# Patient Record
(demographics unavailable — no encounter records)

---

## 2024-10-23 NOTE — HISTORY OF PRESENT ILLNESS
[FreeTextEntry1] : Moved back from [facility] to live FT in [place] with his long term partner has been diagnosed with cognitive impairment and patient is the main carer. [de-identified] : 68 y/o male at high risk of cardiovascular disease due to pre-diabetes, HTN (2014), high lipids, BARRY and strong FH IHD. On Ozempic - has lost 20lbs, Lipitor recently increased by Cardiology. Reports of dizziness, especially when getting out of bed or standing up from a crouched position. This has been occurring more frequently in the past two months.  This coincides with starting SSRI which he is now tapering.

## 2024-10-23 NOTE — HISTORY OF PRESENT ILLNESS
[FreeTextEntry1] : Moved back from [facility] to live FT in [facility] with his long term partner has been diagnosed with cognitive impairment and patient is the main carer. [de-identified] : 68 y/o male at high risk of cardiovascular disease due to pre-diabetes, HTN (2014), high lipids, BARRY and strong FH IHD. On Ozempic - has lost 20lbs, Lipitor recently increased by Cardiology. Reports of dizziness, especially when getting out of bed or standing up from a crouched position. This has been occurring more frequently in the past two months.  This coincides with starting SSRI which he is now tapering.

## 2024-10-23 NOTE — HEALTH RISK ASSESSMENT
[Never] : Never [0-4] : 0-4 [Patient reported colonoscopy was abnormal] : Patient reported colonoscopy was abnormal [Hepatitis C test offered] : Hepatitis C test offered [HIV test declined] : HIV test declined [With Significant Other] : lives with significant other [Retired] : retired [Fully functional (bathing, dressing, toileting, transferring, walking, feeding)] : Fully functional (bathing, dressing, toileting, transferring, walking, feeding) [Fully functional (using the telephone, shopping, preparing meals, housekeeping, doing laundry, using] : Fully functional and needs no help or supervision to perform IADLs (using the telephone, shopping, preparing meals, housekeeping, doing laundry, using transportation, managing medications and managing finances) [Good] : ~his/her~ current health as good [0] : 2) Feeling down, depressed, or hopeless: Not at all (0) [PHQ-2 Negative - No further assessment needed] : PHQ-2 Negative - No further assessment needed [[id number]] : 0 [Change in mental status noted] : No change in mental status noted [ColonoscopyDate] : 04/19/2023 [ColonoscopyComments] : polyps [HIVDate] : 09/24 [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

## 2024-10-23 NOTE — PHYSICAL EXAM
[de-identified] :  Constitutional:  no acute distress, well nourished, well developed and well-appearing.  Eyes:  normal sclera/conjunctiva and pupils equal round and reactive to light.   ENT:  the outer ears and nose were normal in appearance.   Pulmonary:  no respiratory distress, no accessory muscle use, lungs were clear to auscultation bilaterally.   Cardiac:  normal rate, with a regular rhythm, normal S1 and S2 and no murmur heard.   Vascular:  there was no peripheral edema.   Musculoskeletal:  no joint swelling and grossly normal strength and tone.   Skin:  no rash.   Neurology:  coordination grossly intact, no focal deficits and normal gait.   Psychiatric:  memory grossly normal, the affect was normal, oriented to person, place, and time, the mood was normal and insight and judgment were intact.

## 2024-10-23 NOTE — HEALTH RISK ASSESSMENT
[Never] : Never [0-4] : 0-4 [Patient reported colonoscopy was abnormal] : Patient reported colonoscopy was abnormal [Hepatitis C test offered] : Hepatitis C test offered [HIV test declined] : HIV test declined [With Significant Other] : lives with significant other [Retired] : retired [Fully functional (bathing, dressing, toileting, transferring, walking, feeding)] : Fully functional (bathing, dressing, toileting, transferring, walking, feeding) [Fully functional (using the telephone, shopping, preparing meals, housekeeping, doing laundry, using] : Fully functional and needs no help or supervision to perform IADLs (using the telephone, shopping, preparing meals, housekeeping, doing laundry, using transportation, managing medications and managing finances) [Good] : ~his/her~ current health as good [0] : 2) Feeling down, depressed, or hopeless: Not at all (0) [PHQ-2 Negative - No further assessment needed] : PHQ-2 Negative - No further assessment needed [[id number]] : 0 [Change in mental status noted] : No change in mental status noted [ColonoscopyDate] : 04/19/2023 [ColonoscopyComments] : polyps [HIVDate] : 09/24 [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

## 2024-10-23 NOTE — REVIEW OF SYSTEMS
[Nocturia] : nocturia [Dysuria] : no dysuria [Hematuria] : no hematuria [Frequency] : no frequency [FreeTextEntry2] : Eyes:  no discharge and no vision problems.   HEENT:  no earache, no nasal discharge and no sore throat.   Cardiovascular:  no chest pain, no palpitations, no leg claudication, no lower extremity edema and no orthopnea.   Respiratory:  no shortness of breath and no cough.   Gastrointestinal:  no abdominal pain, no constipation, diarrhea, no vomiting, no heartburn and no melena.   Genitourinary:  no dysuria, no hematuria and no frequency.   Integumentary:  no skin rash.   Neurological: no seizures  no headache.   Constitutional review of systems are otherwise negative except as noted in HPI.  [FreeTextEntry5] : Dizziness - on standing

## 2024-10-23 NOTE — ASSESSMENT
[FreeTextEntry1] : He will follow up in 4 weeks to check about Ozempic, do labs as needed then because he had many labs done recently at last visit elsewhere.  He'll bring all including documentation of diabetes with 2 x HBA1c > 6.4%.  Cardiovascular issues - History of seeing a cardiologist Hypertension: On maintenance medication for hypertension. No changes in blood pressure medication recently. Continue current medication. Monitor blood pressure regularly.   Discussed goal of /80 or less, frequency of BP check, encouraged low salt f=diet, exercise and weight within BMI 20-25.  Adherence to current medications reinforced. Hypercholesterolemia: On maintenance medication for cholesterol. Recent lab results show cholesterol levels within normal range. Continue current medication. Regular monitoring of cholesterol levels - will review additional labs at next visit  Dizziness - Experiencing dizziness more frequently in the last two months, especially when getting out of bed or getting up from a crouched position - No recent changes in blood pressure medication - Taking sertraline since August, currently trying to wean off it Hydration, sit from lying, stand from sitting advice given.  Will d/c SSRI by Nov 1. Recent onset of frequent dizziness, especially when changing positions. Could be related to blood pressure medication and recent start of sertraline. Recommendation to stay hydrated and take time when changing positions to allow blood pressure to adjust. Consider reducing sertraline dosage gradually and monitor for changes in dizziness. Risks/Side effects: Dizziness may increase risk of falls or accidents.  Mental health - Started taking sertraline in August due to stress related to partner's health and relocation - Currently trying to wean off sertraline, plans to stop taking it by the end of the month - No feelings of being down, depressed or hopeless in the last two weeks - No loss of interest or pleasure in doing things that used to enjoy in the last two weeks  Constipation - Not usually constipated, but currently having trouble refilling Ozempic prescription  Arm pain and tingling - Recently started experiencing bad pain originating from shoulder and traveling down arm, with tingling in hands - Pain initially terminated at elbow but started moving down arm c/w C4/5 - Seen by physician at walk-in clinic who suggested it might be related to C4/C5 vertebrae - Pain has eased up but not completely gone - Pain started in mid-August, coinciding with moving out of [place] and a lot of packing, boxing, lifting - No X-ray done yet - will get XRay and will refer for PT  Social history - Relocated back to [place] permanently after being part-time resident between [place] and [place] since 2000 - 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 Also uses tadalafil as needed. Continue current medications. Offered DoxyPEP for post-exposure prophylaxis against chlamydia, syphilis, and gonorrhea. We discussed DoxyPEP as an option to reduce risk of GC/CT/syphilis when taken correctly (200 mgs ASAP and within 72 hours of possible exposure).  We also discussed the need to continue to get screened at appropriate intervals as DoxyPEP is % effective. - Has sex outside of relationship, on Apretude for PrEP and gets regular screenings at [facility] [place] We agree that he will continue to get PrEP at [place], consider switch to [facility] later - Taking tadalafil for sexual activity, currently not very sexually active  Prostate issues - On maintenance for BPH, taking tamsulosin for frequency of emptying the bladder - Frequency of urination has calmed down, but still goes to the bathroom about 3 times a night and during the day - No blood in urine or burning or stinging when urinating On tamsulosin for frequency of emptying the bladder. No signs of prostate cancer reported. Continue current medication. Monitor symptoms regularly.  Misc: - Had colonoscopy in 2023, had polyps which were removed - Last HIV test was in September of current year - Does not know when last hepatitis C test was - Had cataract surgery in both eyes in 2019 - Had elective surgery to remove large nevus on back in the '70s - Sleeps well, has mild sleep apnea and uses an oral appliance - All maintenance medications were introduced by [place] doctor - Refills medications through [facility] - Will bring labs from where getting the injectable prep at next visit in a month - Feels fine apart from dizziness and pinched nerve - Partner is having a bypass surgery at [facility] next week  Diabetes - new diagnosis based on HBA1cs that patient shows from prior notes. Recent lab results show elevated glucose levels indicating diabetes. Was on Ozempic but prescription ran out. Attempt to refill Ozempic prescription. In the meantime, resume metformin once daily until Ozempic is available again. Risks/Side effects discussed.  HBA1c to be logged in and labs done at next visit - Started Ozempic in May and ran out by end of July, started at .25 and then increased to .5 after 10 days - Blood work from early 2024 shows glucose levels out of normal range, indicating pre-diabetes or diabetes  Neck and arm pain Reports pain originating in neck and traveling down arm with tingling sensation in hands. Possible issue with C4/C5 vertebrae or rhomboid muscle. Referral for physical therapy and X-rays of the neck. Recommendation for exercises to strengthen core muscles. Diagnostic tests ordered - X-rays of the neck due to reported pain and tingling sensation - Physical therapy referral  Sleep apnea Mild sleep apnea managed with oral appliance. Continue use of oral appliance as recommended by previous healthcare provider.  Medication management All maintenance medications were introduced by previous [place] doctor. Can refill as needed. Send refills to the retail pharmacy or [facility] as per patient's preference. 10. Follow-up. Follow-up appointment in a month. Bring labs from where injectable prep is being received. Get COVID and flu shot at that time.  ICD-10 codes (8) - Essential (primary) hypertension [I10] - Hyperlipidemia, unspecified [E78.5] - Benign prostatic hyperplasia with lower urinary tract symptoms [N40.1] - Dizziness and giddiness [R42] - Prediabetes [R73.03] - Cervicalgia [M54.2] - Sleep apnea, unspecified [G47.30] - High risk heterosexual behavior [Z72.51]

## 2024-10-23 NOTE — PHYSICAL EXAM
[de-identified] :  Constitutional:  no acute distress, well nourished, well developed and well-appearing.  Eyes:  normal sclera/conjunctiva and pupils equal round and reactive to light.   ENT:  the outer ears and nose were normal in appearance.   Pulmonary:  no respiratory distress, no accessory muscle use, lungs were clear to auscultation bilaterally.   Cardiac:  normal rate, with a regular rhythm, normal S1 and S2 and no murmur heard.   Vascular:  there was no peripheral edema.   Musculoskeletal:  no joint swelling and grossly normal strength and tone.   Skin:  no rash.   Neurology:  coordination grossly intact, no focal deficits and normal gait.   Psychiatric:  memory grossly normal, the affect was normal, oriented to person, place, and time, the mood was normal and insight and judgment were intact.

## 2024-10-23 NOTE — ASSESSMENT
[FreeTextEntry1] : He will follow up in 4 weeks to check about Ozempic, do labs as needed then because he had many labs done recently at last visit elsewhere.  He'll bring all including documentation of diabetes with 2 x HBA1c > 6.4%.  Cardiovascular issues - History of seeing a cardiologist Hypertension: On maintenance medication for hypertension. No changes in blood pressure medication recently. Continue current medication. Monitor blood pressure regularly.   Discussed goal of /80 or less, frequency of BP check, encouraged low salt f=diet, exercise and weight within BMI 20-25.  Adherence to current medications reinforced. Hypercholesterolemia: On maintenance medication for cholesterol. Recent lab results show cholesterol levels within normal range. Continue current medication. Regular monitoring of cholesterol levels - will review additional labs at next visit  Dizziness - Experiencing dizziness more frequently in the last two months, especially when getting out of bed or getting up from a crouched position - No recent changes in blood pressure medication - Taking sertraline since August, currently trying to wean off it Hydration, sit from lying, stand from sitting advice given.  Will d/c SSRI by Nov 1. Recent onset of frequent dizziness, especially when changing positions. Could be related to blood pressure medication and recent start of sertraline. Recommendation to stay hydrated and take time when changing positions to allow blood pressure to adjust. Consider reducing sertraline dosage gradually and monitor for changes in dizziness. Risks/Side effects: Dizziness may increase risk of falls or accidents.  Mental health - Started taking sertraline in August due to stress related to partner's health and relocation - Currently trying to wean off sertraline, plans to stop taking it by the end of the month - No feelings of being down, depressed or hopeless in the last two weeks - No loss of interest or pleasure in doing things that used to enjoy in the last two weeks  Constipation - Not usually constipated, but currently having trouble refilling Ozempic prescription  Arm pain and tingling - Recently started experiencing bad pain originating from shoulder and traveling down arm, with tingling in hands - Pain initially terminated at elbow but started moving down arm c/w C4/5 - Seen by physician at walk-in clinic who suggested it might be related to C4/C5 vertebrae - Pain has eased up but not completely gone - Pain started in mid-August, coinciding with moving out of [place] and a lot of packing, boxing, lifting - No X-ray done yet - will get XRay and will refer for PT  Social history - Relocated back to [place] permanently after being part-time resident between [place] and [place] since 2000 - 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 Also uses tadalafil as needed. Continue current medications. Offered DoxyPEP for post-exposure prophylaxis against chlamydia, syphilis, and gonorrhea. We discussed DoxyPEP as an option to reduce risk of GC/CT/syphilis when taken correctly (200 mgs ASAP and within 72 hours of possible exposure).  We also discussed the need to continue to get screened at appropriate intervals as DoxyPEP is % effective. - Has sex outside of relationship, on Apretude for PrEP and gets regular screenings at [facility] [place] We agree that he will continue to get PrEP at [place], consider switch to [facility] later - Taking tadalafil for sexual activity, currently not very sexually active  Prostate issues - On maintenance for BPH, taking tamsulosin for frequency of emptying the bladder - Frequency of urination has calmed down, but still goes to the bathroom about 3 times a night and during the day - No blood in urine or burning or stinging when urinating On tamsulosin for frequency of emptying the bladder. No signs of prostate cancer reported. Continue current medication. Monitor symptoms regularly.  Misc: - Had colonoscopy in 2023, had polyps which were removed - Last HIV test was in September of current year - Does not know when last hepatitis C test was - Had cataract surgery in both eyes in 2019 - Had elective surgery to remove large nevus on back in the '70s - Sleeps well, has mild sleep apnea and uses an oral appliance - All maintenance medications were introduced by [place] doctor - Refills medications through [facility] - Will bring labs from where getting the injectable prep at next visit in a month - Feels fine apart from dizziness and pinched nerve - Partner is having a bypass surgery at [facility] next week  Diabetes - new diagnosis based on HBA1cs that patient shows from prior notes. Recent lab results show elevated glucose levels indicating diabetes. Was on Ozempic but prescription ran out. Attempt to refill Ozempic prescription. In the meantime, resume metformin once daily until Ozempic is available again. Risks/Side effects discussed.  HBA1c to be logged in and labs done at next visit - Started Ozempic in May and ran out by end of July, started at .25 and then increased to .5 after 10 days - Blood work from early 2024 shows glucose levels out of normal range, indicating pre-diabetes or diabetes  Neck and arm pain Reports pain originating in neck and traveling down arm with tingling sensation in hands. Possible issue with C4/C5 vertebrae or rhomboid muscle. Referral for physical therapy and X-rays of the neck. Recommendation for exercises to strengthen core muscles. Diagnostic tests ordered - X-rays of the neck due to reported pain and tingling sensation - Physical therapy referral  Sleep apnea Mild sleep apnea managed with oral appliance. Continue use of oral appliance as recommended by previous healthcare provider.  Medication management All maintenance medications were introduced by previous [place] doctor. Can refill as needed. Send refills to the retail pharmacy or [facility] as per patient's preference. 10. Follow-up. Follow-up appointment in a month. Bring labs from where injectable prep is being received. Get COVID and flu shot at that time.  ICD-10 codes (8) - Essential (primary) hypertension [I10] - Hyperlipidemia, unspecified [E78.5] - Benign prostatic hyperplasia with lower urinary tract symptoms [N40.1] - Dizziness and giddiness [R42] - Prediabetes [R73.03] - Cervicalgia [M54.2] - Sleep apnea, unspecified [G47.30] - High risk heterosexual behavior [Z72.51]

## 2024-11-20 NOTE — REVIEW OF SYSTEMS
[FreeTextEntry2] : Eyes:  no discharge and no vision problems.   HEENT:  no earache, no nasal discharge and no sore throat.   Cardiovascular:  no chest pain, no palpitations, no leg claudication, no lower extremity edema and no orthopnea.   Respiratory:  no shortness of breath and no cough.   Gastrointestinal:  no abdominal pain, no constipation, diarrhea, no vomiting, no heartburn and no melena.   Genitourinary:  no dysuria, no hematuria and no frequency.   Integumentary:  no skin rash.   Neurological: no seizures  no headache.   Constitutional review of systems are otherwise negative except as noted in HPI.  [FreeTextEntry5] : Dizziness

## 2024-11-20 NOTE — ASSESSMENT
[FreeTextEntry1] : Assessment & Plan Presyncope - Patient reports experiencing presyncope, which he attributes to age. He denies experiencing dizziness. Blood pressure checked while lying and standing, no significant drop noted indicating no postural hypotension. - Monitor symptoms. If symptoms worsen or patient experiences a fall, further evaluation will be needed. - Encourage increased fluid intake - Consider if Apretude could be at least partially causing this although he has been on this for much longer than he has had these symptoms  PrEP -this is being managed elsewhere and he is up to date with screenings  Paresthesia in the upper extremity - Patient reports experiencing paresthesia in his upper extremity, which has dissipated but is still present. He has started physiotherapy for this issue. He also reports that he has been on meloxicam, which he stopped taking last week. Continue physiotherapy. Monitor symptoms.  Lab tests - Plan to conduct laboratory tests to evaluate thyroid function, blood glucose levels, and complete blood count. - Await lab results.  Colonoscopy - Last colonoscopy was in 2023, during which polyps were identified and excised. - Plan for another colonoscopy in 2026.  Follow-up - Schedule a follow-up visit in three months. - Patient is on the portal and can send messages for any concerns. - Discuss the lightheadedness and potential causes, including medication side effects, at the next visit.

## 2024-11-20 NOTE — HISTORY OF PRESENT ILLNESS
[FreeTextEntry1] : Here to follow up on a couple of issues from summer Partner bypass surgery at [place] 11/1/24 and stent planned Subjective Patient is a 69-year-old male on Ozempic for diabetes management. He started on a small dose at [place], but due to a physician's maternity leave, he ended up back on 0.25 mg. He has previously been on 0.5 mg. He has taken three doses of 0.25 mg. When off the medication, he experienced food cravings and gained 6 pounds. He reports lightheadedness, not dizziness, and attributes it to age. His mental health is stable with no self-harm thoughts, despite stress and work. He reports tingling in his arm, which has improved but persists. He started physiotherapy and stopped meloxicam last week.  [de-identified] : 1) Cardiovascular: Hyperlipidemia and Hypertension:  2) Dizziness: thought to be due to SSRI which he was stopping - still feels lightheaded at times and is off SSRI. 3) Mental Health: stressed but feels as good as has been for many years 4) Arm pain and tingling improving 5) PrEP: on Apretude for PrEP and gets regular screenings at [facility] [place] We agree that he will continue to get PrEP at [place], consider switch to [facility] later 6) ED: Taking tadalafil for sexual activity, currently not very sexually active 7) BPH: tamsulosin for frequency of emptying the bladder 8) HCM: colonoscopy in 2023, had polyps which were removed 9) DM: restarted Ozempic at last visit and has taken for 3 doses

## 2024-11-20 NOTE — HISTORY OF PRESENT ILLNESS
[FreeTextEntry1] : Here to follow up on a couple of issues from summer Partner bypass surgery at [place] 11/1/24 and stent planned Subjective Patient is a 69-year-old male on Ozempic for diabetes management. He started on a small dose at [place], but due to a physician's maternity leave, he ended up back on 0.25 mg. He has previously been on 0.5 mg. He has taken three doses of 0.25 mg. When off the medication, he experienced food cravings and gained 6 pounds. He reports lightheadedness, not dizziness, and attributes it to age. His mental health is stable with no self-harm thoughts, despite stress and work. He reports tingling in his arm, which has improved but persists. He started physiotherapy and stopped meloxicam last week.  [de-identified] : 1) Cardiovascular: Hyperlipidemia and Hypertension:  2) Dizziness: thought to be due to SSRI which he was stopping - still feels lightheaded at times and is off SSRI. 3) Mental Health: stressed but feels as good as has been for many years 4) Arm pain and tingling improving 5) PrEP: on Apretude for PrEP and gets regular screenings at [facility] [place] We agree that he will continue to get PrEP at [place], consider switch to [facility] later 6) ED: Taking tadalafil for sexual activity, currently not very sexually active 7) BPH: tamsulosin for frequency of emptying the bladder 8) HCM: colonoscopy in 2023, had polyps which were removed 9) DM: restarted Ozempic at last visit and has taken for 3 doses

## 2025-01-13 NOTE — HISTORY OF PRESENT ILLNESS
[FreeTextEntry1] : 68 y/o male with h/o cad, htn, hl, predm, family h/o early cvd, nilesh who presents for f/up today  last seen   -LpA elevated  -Calcium score 10/24: Cardiac: 1.  The calcium score is moderate at 119 Agatston units, which is at the 48 percentile, adjusted for age, gender and race. 2.  Aortic and aortic valve calcification. Non-cardiac: No acute findings  -Echo 10/24: 1. Left ventricular cavity is normal in size. Left ventricular wall thickness is normal. Left ventricular systolic function is normal with an ejection fraction of 63 % by Rivas's method of disks. There are no regional wall motion abnormalities seen. 2. Normal left ventricular diastolic function. 3. Normal right ventricular cavity size, with normal wall thickness, and normal right ventricular systolic function. 4. No pericardial effusion seen. 5. Chordal systolic anterior motion is present without a significant gradient at rest. 6. Mild left ventricular hypertrophy. 7. There is moderate thickening of the aortic valve leaflets. 8. No significant valvular disease.  no cp, sob, syncope, palpitations, orthopnea, pnd, edema, fatigue notes some LH with change in position  mild nilesh diagnosed - uses oral appliance   htn diagnosed 10 years ago started statin  - lipitor increased to 40 - 6 weeks ago ozempic started a few months ago - has lost 20 lbs  on sertraline - not seeing therapist  walks for exercise diet could be improved stress moderate sleep 8 hours   PMH/PSH: htn hl predm bph cataract surgery nilesh cad   ALL: nkda  MEDS: lipitor 40 mg qhs norvasc 5 mg qd tadalafil 20 mg sertraline 50 mg qd asa 81 mg qd tamsulosin .8 mg qd vit d/c zinc tumeric apretude roopa 600 mg ER every 2 months ozempic   SH: no tobacco/etoh/drugs from [place] lived [place] 50 years retired former worked [employer] no children    FH: mother -  MI 81 father -  74, CVA 65 brother -  61 MI sister - alive, htn, hl, 78

## 2025-01-13 NOTE — DISCUSSION/SUMMARY
[Patient] : the patient [___ Month(s)] : in [unfilled] month(s) [FreeTextEntry1] : 68 y/o male with h/o cad, htn, hl, predm, cad, family h/o early cvd, nilesh who presents for initial evaluation today   -Calcium score 10/24: Cardiac: 1.  The calcium score is moderate at 119 Agatston units, which is at the 48 percentile, adjusted for age, gender and race. 2.  Aortic and aortic valve calcification. Non-cardiac: No acute findings -Echo 10/24: 1. Left ventricular cavity is normal in size. Left ventricular wall thickness is normal. Left ventricular systolic function is normal with an ejection fraction of 63 % by Rivas's method of disks. There are no regional wall motion abnormalities seen. 2. Normal left ventricular diastolic function. 3. Normal right ventricular cavity size, with normal wall thickness, and normal right ventricular systolic function. 4. No pericardial effusion seen. 5. Chordal systolic anterior motion is present without a significant gradient at rest. 6. Mild left ventricular hypertrophy. 7. There is moderate thickening of the aortic valve leaflets. 8. No significant valvular disease. -ekg 9/24 - nsr, normal intervals, no st/t changes -labs 2024 reviewed, lipids ordered  -close monitoring blood sugar for predm -continue asa, statin -continue norvasc - lower to 2.5 mg qd -nilesh - using oral appliance -on ozempic -counseled on cvd risk factors -f/up 1 months for cvd risk factors, bp  I have spent 30 minutes reviewing labs, records, tests and discussed cvd risk factors, htn, hl.

## 2025-01-30 NOTE — HISTORY OF PRESENT ILLNESS
[FreeTextEntry1] : Here for follow up of multiple medical issues [de-identified] : Interim history and medication adjustments: 1) Lipitor was increased to 40 mg and aspirin started based on intermediate Calcium score from CT scan 2) Continues with some post hypotension but this has improved since decreasing BP medications  Subjective 11/1/24 his partner (Olegario) had bypass and stents and is doing well.  The patient reports that their partner, Olegario, has undergone several medical procedures, including a bypass and stent placements, due to blockages. The partner is currently doing well, although there was significant bruising in the groin area. The patient's weeks are filled with medical visits for both themselves and their partner, which is limiting.  PrEP -  The patient is on Apretude for PrEP, with the most recent dose administered last week at [place]. The patient mentions being celibate within their relationship and having some sexual relationships outside of this relationship.  They use Doxycycline as post-exposure prophylaxis, with a recent encounter occurring the previous night. The patient plans to take the medication today.   Weight loss -  They are also taking Ozempic at a 0.5 dosage for prediabetes. The patient experiences dizziness when getting up from bed or a seated position, which persists despite a recent reduction in amlodipine dosage from 5 mg to 2.5 mg. The patient acknowledges not drinking enough fluids during the day, with most fluid intake occurring between 7 PM and 1 AM, primarily in the form of homemade iced tea.  Exercise was previously regular, with treadmill use four times a week until July, when their partner's health issues arose. The partner has reduced cannabis use, which the patient believes may have been related to previous issues with memory loss and confusion. The patient expresses a desire to return to their exercise routine as their partner's health stabilizes.  They received flu and COVID vaccinations in October and have started a Hepatitis B vaccination series, with the next dose scheduled for March.

## 2025-01-30 NOTE — ASSESSMENT
[FreeTextEntry1] : Interim history: - Recent STD and HIV tests were negative - Last colonoscopy in 2021 with polyps found - Amlodipine dose reduced from 5 mg to 2.5 mg two weeks ago - Lipid panel done by cardiologist a couple of weeks ago - Recent blood work for PrEP about a week or 10 days ago - Hepatitis B vaccine received, one of three doses completed  Assessment & Plan Cardiovascular Health: Patient has a family history of cardiovascular issues and has been advised by a cardiologist to increase Lipitor dosage from 20 mg to 40 mg due to the presence of plaque. Low-dose aspirin has also been recommended. Dizziness upon standing may be related to blood pressure medication and this has improved since Amlodipine was decreased. Monitor dizziness and adjust fluid intake throughout the day. No need for additional blood pressure control as current levels are satisfactory.  Dizziness likely related to blood pressure medication. Amlodipine dosage has been reduced from 5 mg to 2.5 mg to address dizziness.  Monitor dizziness and ensure adequate fluid intake throughout the day. No further changes to medication at this time.  Continue with increased Lipitor dosage and low-dose aspirin.  Pre-Diabetes: Patient has a diagnosis of pre-diabetes and is on Ozempic 0.5 mg dosage for weight loss. Encourage exercise, specifically returning to treadmill use, to help manage pre-diabetes. Schedule A1C test in three months to monitor blood sugar levels.  Sexual Health: Patient is on Apretude for PrEP and has been prescribed doxycycline for post-exposure prophylaxis after sexual encounters.  Continue with Apretude for PrEP. Take doxycycline within 72 hours after sexual encounters. Conduct regular STD screenings, including oral and urine tests, and blood tests for A1C, HIV, syphilis, and hepatitis C.  Vaccinations: Patient has received one dose of the Hepatitis B vaccine. Continue with the Hepatitis B vaccination series, with the next dose scheduled in March.  ICD-10 codes (5) - Dizziness and giddiness [R42] - Prediabetes [R73.03] - Essential (primary) hypertension [I10] - Hyperlipidemia, unspecified [E78.5] - Encounter for immunization [Z23]

## 2025-01-30 NOTE — HISTORY OF PRESENT ILLNESS
[FreeTextEntry1] : Here for follow up of multiple medical issues [de-identified] : Interim history and medication adjustments: 1) Lipitor was increased to 40 mg and aspirin started based on intermediate Calcium score from CT scan 2) Continues with some post hypotension but this has improved since decreasing BP medications  Subjective 11/1/24 his partner (Olegario) had bypass and stents and is doing well.  The patient reports that their partner, Olegario, has undergone several medical procedures, including a bypass and stent placements, due to blockages. The partner is currently doing well, although there was significant bruising in the groin area. The patient's weeks are filled with medical visits for both themselves and their partner, which is limiting.  PrEP -  The patient is on Apretude for PrEP, with the most recent dose administered last week at [place]. The patient mentions being celibate within their relationship and having some sexual relationships outside of this relationship.  They use Doxycycline as post-exposure prophylaxis, with a recent encounter occurring the previous night. The patient plans to take the medication today.   Weight loss -  They are also taking Ozempic at a 0.5 dosage for prediabetes. The patient experiences dizziness when getting up from bed or a seated position, which persists despite a recent reduction in amlodipine dosage from 5 mg to 2.5 mg. The patient acknowledges not drinking enough fluids during the day, with most fluid intake occurring between 7 PM and 1 AM, primarily in the form of homemade iced tea.  Exercise was previously regular, with treadmill use four times a week until July, when their partner's health issues arose. The partner has reduced cannabis use, which the patient believes may have been related to previous issues with memory loss and confusion. The patient expresses a desire to return to their exercise routine as their partner's health stabilizes.  They received flu and COVID vaccinations in October and have started a Hepatitis B vaccination series, with the next dose scheduled for March.

## 2025-03-04 NOTE — HISTORY OF PRESENT ILLNESS
[FreeTextEntry1] : 69 y/o male with h/o cad, htn, hl, predm, family h/o early cvd, nilesh who presents for f/up today  last seen   norvasc lowered to 2.5 mg  notes some improvement in LH but still notes some positional LH no cp, sob, syncope, palpitations, orthopnea, pnd, edema, fatigue  -LpA elevated  -Calcium score 10/24: Cardiac: 1. The calcium score is moderate at 119 Agatston units, which is at the 48 percentile, adjusted for age, gender and race. 2. Aortic and aortic valve calcification. Non-cardiac: No acute findings  -Echo 10/24: 1. Left ventricular cavity is normal in size. Left ventricular wall thickness is normal. Left ventricular systolic function is normal with an ejection fraction of 63 % by Rivas's method of disks. There are no regional wall motion abnormalities seen. 2. Normal left ventricular diastolic function. 3. Normal right ventricular cavity size, with normal wall thickness, and normal right ventricular systolic function. 4. No pericardial effusion seen. 5. Chordal systolic anterior motion is present without a significant gradient at rest. 6. Mild left ventricular hypertrophy. 7. There is moderate thickening of the aortic valve leaflets. 8. No significant valvular disease.    mild nilesh diagnosed - uses oral appliance   htn diagnosed 10 years ago started statin  - lipitor increased to 40 - 6 weeks ago ozempic started a few months ago - has lost 20 lbs  on sertraline - not seeing therapist  walks for exercise diet could be improved stress moderate sleep 8 hours   PMH/PSH: htn hl predm bph cataract surgery nilesh cad   ALL: nkda  MEDS: lipitor 40 mg qhs norvasc 2.5 mg qd tadalafil 20 mg sertraline 50 mg qd asa 81 mg qd tamsulosin .8 mg qd vit d/c zinc tumeric apretude roopa 600 mg ER every 2 months ozempic   SH: no tobacco/etoh/drugs from [place] lived [place] 50 years retired former worked [employer] no children    FH: mother -  MI 81 father -  74, CVA 65 brother -  61 MI sister - alive, htn, hl, 78

## 2025-03-04 NOTE — DISCUSSION/SUMMARY
[Patient] : the patient [___ Month(s)] : in [unfilled] month(s) [EKG obtained to assist in diagnosis and management of assessed problem(s)] : EKG obtained to assist in diagnosis and management of assessed problem(s) [FreeTextEntry1] : 71 y/o male with h/o cad, htn, hl, predm, cad, family h/o early cvd, nilesh who presents for f/up today  -Calcium score 10/24: Cardiac: 1. The calcium score is moderate at 119 Agatston units, which is at the 48 percentile, adjusted for age, gender and race. 2. Aortic and aortic valve calcification. Non-cardiac: No acute findings -Echo 10/24: 1. Left ventricular cavity is normal in size. Left ventricular wall thickness is normal. Left ventricular systolic function is normal with an ejection fraction of 63 % by Rivas's method of disks. There are no regional wall motion abnormalities seen. 2. Normal left ventricular diastolic function. 3. Normal right ventricular cavity size, with normal wall thickness, and normal right ventricular systolic function. 4. No pericardial effusion seen. 5. Chordal systolic anterior motion is present without a significant gradient at rest. 6. Mild left ventricular hypertrophy. 7. There is moderate thickening of the aortic valve leaflets. 8. No significant valvular disease. -ekg ordered today - nsr, normal intervals, no st/t changes -labs 2024 reviewed  -close monitoring blood sugar for predm -continue asa, statin - increase to lipitor 80 -dc norvasc and close monitoring bp -nilesh - using oral appliance -on ozempic -counseled on cvd risk factors -f/up 3 months for bp, lipids  I have spent 30 minutes reviewing labs, records, tests and discussed cvd risk factors, htn, hl.

## 2025-04-03 NOTE — HEALTH RISK ASSESSMENT
[0] : 2) Feeling down, depressed, or hopeless: Not at all (0) [PHQ-2 Negative - No further assessment needed] : PHQ-2 Negative - No further assessment needed [Never] : Never [[id number]] : 0

## 2025-04-03 NOTE — ASSESSMENT
[FreeTextEntry1] : 1) Healthcare Maintenance Test results: - Colonoscopy in 2021: Polyps removed, no major concerns - Prostate cancer screening blood test planned (not performed during this visit) 2) HTN Blood Pressure Management: Blood pressure is stable after discontinuation of amlodipine due to lightheadedness. No current medication for blood pressure. Monitor for dizziness when using Cialis and consider reducing the dose if dizziness occurs. Ensure adequate hydration when taking Cialis. 3) Weight management Patient is on Ozempic, recently increased to 1.0 mg, prescribed by [facility]. Continue current regimen. No changes recommended. 4) Measles Immunity: History of measles infection at age 14. Consider blood test to check immunity levels at next blood draw. 5) Colonoscopy: Last colonoscopy in 2021 with polyp removal. Schedule a referral for a follow-up colonoscopy. Gather previous test results for the appointment. 6) Prostate Cancer Screening: No family history of prostate cancer. Screening is considered. Perform a PSA blood test. If elevated, follow up with an MRI. 7) Tamsulosin Use: Uncertain effectiveness of tamsulosin. Discontinue tamsulosin to assess any changes in symptoms.

## 2025-04-03 NOTE — HISTORY OF PRESENT ILLNESS
[FreeTextEntry1] : Partner bypass surgery at [place] 11/1/24 and stent  DM on Ozempic PrEP: Apretude at [facility] HTN and lipids ED  [de-identified] : Abdiaziz is here for routine follow up.  Discussed the health of the , who is 76 years old and has undergone a bypass, received three stents. He mentions that life has changed due to the 's health, leading to less travel and a preference to stay near medical facilities. Previously experienced lightheadedness while on amlodipine, which was discontinued, resulting in improvement. Currently uses Cialis as needed, being aware of potential dizziness if blood pressure is low. Continues to take injectable PrEP at [facility], with the last injection approximately 10 days ago. He is getting all PrEP screenings there.  Recently increased the Ozempic dose to 1.0 mg two weeks ago, prescribed by [facility], but has not seen significant weight change. Had measles around age 12 or 14. Underwent a colonoscopy in 2021, during which polyps were removed without significant concern. Observes Ramadan and is currently fasting to make up for missed days. Takes tamsulosin but is unsure of its effectiveness.

## 2025-04-03 NOTE — HEALTH RISK ASSESSMENT
[0] : 2) Feeling down, depressed, or hopeless: Not at all (0) [PHQ-2 Negative - No further assessment needed] : PHQ-2 Negative - No further assessment needed [Never] : Never [[id number]] : 0

## 2025-04-03 NOTE — HEALTH RISK ASSESSMENT
[0] : 2) Feeling down, depressed, or hopeless: Not at all (0) [PHQ-2 Negative - No further assessment needed] : PHQ-2 Negative - No further assessment needed [Never] : Never [[id number]] : 0

## 2025-04-03 NOTE — HISTORY OF PRESENT ILLNESS
[FreeTextEntry1] : Partner bypass surgery at [place] 11/1/24 and stent  DM on Ozempic PrEP: Apretude at [facility] HTN and lipids ED  [de-identified] : Abdiaziz is here for routine follow up.  Discussed the health of the , who is 76 years old and has undergone a bypass, received three stents. He mentions that life has changed due to the 's health, leading to less travel and a preference to stay near medical facilities. Previously experienced lightheadedness while on amlodipine, which was discontinued, resulting in improvement. Currently uses Cialis as needed, being aware of potential dizziness if blood pressure is low. Continues to take injectable PrEP at [facility], with the last injection approximately 10 days ago. He is getting all PrEP screenings there.  Recently increased the Ozempic dose to 1.0 mg two weeks ago, prescribed by [facility], but has not seen significant weight change. Had measles around age 12 or 14. Underwent a colonoscopy in 2021, during which polyps were removed without significant concern. Observes Ramadan and is currently fasting to make up for missed days. Takes tamsulosin but is unsure of its effectiveness.

## 2025-04-03 NOTE — HISTORY OF PRESENT ILLNESS
[FreeTextEntry1] : Partner bypass surgery at [place] 11/1/24 and stent  DM on Ozempic PrEP: Apretude at [facility] HTN and lipids ED  [de-identified] : Abdiaziz is here for routine follow up.  Discussed the health of the , who is 76 years old and has undergone a bypass, received three stents. He mentions that life has changed due to the 's health, leading to less travel and a preference to stay near medical facilities. Previously experienced lightheadedness while on amlodipine, which was discontinued, resulting in improvement. Currently uses Cialis as needed, being aware of potential dizziness if blood pressure is low. Continues to take injectable PrEP at [facility], with the last injection approximately 10 days ago. He is getting all PrEP screenings there.  Recently increased the Ozempic dose to 1.0 mg two weeks ago, prescribed by [facility], but has not seen significant weight change. Had measles around age 12 or 14. Underwent a colonoscopy in 2021, during which polyps were removed without significant concern. Observes Ramadan and is currently fasting to make up for missed days. Takes tamsulosin but is unsure of its effectiveness.

## 2025-05-13 NOTE — DISCUSSION/SUMMARY
[Patient] : the patient [___ Month(s)] : in [unfilled] month(s) [FreeTextEntry1] : 69 y/o male with h/o cad, htn, hl, predm, cad, family h/o early cvd, nilesh who presents for f/up today  -advised f/up with pcp for headaches -CTA cor 4/24: calcium score 158, mild nonobstructive plaque prox LAD, mild mid LCx, mild prox OM, mild prox/mid RCA, mild calcifications AV and annulus -Calcium score 10/24: Cardiac: 1. The calcium score is moderate at 119 Agatston units, which is at the 48 percentile, adjusted for age, gender and race. 2. Aortic and aortic valve calcification. Non-cardiac: No acute findings -Echo 10/24: 1. Left ventricular cavity is normal in size. Left ventricular wall thickness is normal. Left ventricular systolic function is normal with an ejection fraction of 63 % by Rivas's method of disks. There are no regional wall motion abnormalities seen. 2. Normal left ventricular diastolic function. 3. Normal right ventricular cavity size, with normal wall thickness, and normal right ventricular systolic function. 4. No pericardial effusion seen. 5. Chordal systolic anterior motion is present without a significant gradient at rest. 6. Mild left ventricular hypertrophy. 7. There is moderate thickening of the aortic valve leaflets. 8. No significant valvular disease. -ekg 3/25 - nsr, normal intervals, no st/t changes -labs 2024 reviewed, lipids, A1c ordered -LpA elevated -close monitoring blood sugar for predm -continue asa, statin   -nilesh - using oral appliance -on ozempic -counseled on cvd risk factors -f/up 6 months for cvd risk factors  I have spent 30 minutes reviewing labs, records, tests and discussed cvd risk factors, htn, hl.

## 2025-05-13 NOTE — HISTORY OF PRESENT ILLNESS
[FreeTextEntry1] : 71 y/o male with h/o cad, htn, hl, predm, family h/o early cvd, nilesh who presents for f/up today  last seen 3/25  notes new left sided headaches at night for past few weeks norvsac dc'd lipitor increased to 80  -CTA cor : calcium score 158, mild nonobstructive plaque prox LAD, mild mid LCx, mild prox OM, mild prox/mid RCA, mild calcifications AV and annulus  no cp, sob, syncope, palpitations, orthopnea, pnd, edema, fatigue  -LpA elevated  -Calcium score 10/24: Cardiac: 1. The calcium score is moderate at 119 Agatston units, which is at the 48 percentile, adjusted for age, gender and race. 2. Aortic and aortic valve calcification. Non-cardiac: No acute findings  -Echo 10/24: 1. Left ventricular cavity is normal in size. Left ventricular wall thickness is normal. Left ventricular systolic function is normal with an ejection fraction of 63 % by Rivas's method of disks. There are no regional wall motion abnormalities seen. 2. Normal left ventricular diastolic function. 3. Normal right ventricular cavity size, with normal wall thickness, and normal right ventricular systolic function. 4. No pericardial effusion seen. 5. Chordal systolic anterior motion is present without a significant gradient at rest. 6. Mild left ventricular hypertrophy. 7. There is moderate thickening of the aortic valve leaflets. 8. No significant valvular disease.   mild nilesh diagnosed - uses oral appliance   htn diagnosed 10 years ago started statin  - lipitor increased to 40 - 6 weeks ago ozempic started a few months ago - has lost 20 lbs  on sertraline - not seeing therapist  walks for exercise diet could be improved stress moderate sleep 8 hours   PMH/PSH: htn hl predm bph cataract surgery nilesh cad   ALL: nkda  MEDS: lipitor 80 mg qhs tadalafil 20 mg sertraline 50 mg qd asa 81 mg qd tamsulosin .8 mg qd vit d/c zinc tumeric apretude roopa 600 mg ER every 2 months ozempic   SH: no tobacco/etoh/drugs from [place] lived [place] years retired former worked [employer] no children    FH: mother -  MI 81 father -  74, CVA 65 brother -  61 MI sister - alive, htn, hl, 78

## 2025-06-20 NOTE — ASSESSMENT
[FreeTextEntry1] : 69 YO M with PMH of CAD, HLD, BPH, prediabetes, BARRY presents for colon cancer screening. Patient feels well without GI complaints.   #History of Colon Polyps - last cscope 2023: 7 polyps, diverticulosis, non bleeding internal hemorrhoids. Repeat in 3 years (2026) - will request pathology report - 9/24 cmp 11/24 cbc unremarkable - Schedule patient for colonoscopy at [facility] with Sutab prep in April 2026. - will need to hold Ozempic 1 week prior to procedure  - Discussed R/A/I/B with patient. Education provided on preparation instructions and the need for an escort.  Follow up post procedure

## 2025-06-20 NOTE — HISTORY OF PRESENT ILLNESS
[FreeTextEntry1] : 69 YO M with PMH of HLD, BPH, prediabetes presents for colon cancer screening.    During recent travel had 8 days of diarrhea that has now resolved. Typically has 1 BM daily. Patient otherwise feels well without GI complaints. GI ROS negative for unintentional weight loss, fevers/chills, dysphagia, reflux, abdominal pain, bloating, nausea, vomiting, early satiety, constipation, melena, hematochezia.   Moved back to [place] from [place] last year    Referred by: partner is a patient of Dr. Segura   PMH: HLD, BPH, prediabetes  PSH: cataract  Family history: denies GI malignancy  Allergies: NKDA Medications: atorvastatin, asa 81, tamsulosin, ozempic, apretude  Supplements/OTC: turmeric, zinc, vit c, vit d AC or NSAIDs: asa   Colon Cancer Screenin 7 polyps, diverticulosis, non bleeding internal hemorrhoids. repeat in 3 years  EGD: denies Labs:  cmp  cbc unremarkable  Imaging: denies    Tobacco: denies  Alcohol: denies  Drugs: denies

## 2025-06-25 NOTE — HISTORY OF PRESENT ILLNESS
[FreeTextEntry1] : Headaches - over last few months, Abdiaziz reports he has had increasing number of headaches, These tend to occur in the PM and resolve after a night's sleep.  They have decreased since he had a dental problem fixed 4 weeks ago, although he did have another two days ago.  We explored possible connection to caffeine, sleep apnea and dental issue.   [de-identified] : Co-management: Ozempic for pre-DM and Apretude for PrEP prescribed at [facility] where he has gotten his 3 monthly labs on time.  He will consider moving his care to one PCP but is concerned about moving his Ozempic and Apretude because it was a long uphill struggle to get these approved at [facility].

## 2025-06-25 NOTE — PLAN
[FreeTextEntry1] : 1) Headaches Likely related to Dental, resolved Will monitor and use mouth guard for sleep agnea Will observe if caffeine days > headaches 2) PrEP - continue care at [facility] 3) Pre-DM - continue care at [facility] 4) Urinary frequency - will try to decrease tamsulosin as feels doesn't need it

## 2025-06-25 NOTE — PLAN
[FreeTextEntry1] : 1) Headaches Likely related to Dental, resolved Will monitor and use mouth guard for sleep agnea Will observe if caffeine days > headaches 2) PrEP - continue care at [facility] 3) Pre-DM - continue care at [facility] 4) Urinary frequency - will try to decrease tamsulosin as feels doesn't need it

## 2025-06-25 NOTE — HISTORY OF PRESENT ILLNESS
[FreeTextEntry1] : Headaches - over last few months, Abdiaziz reports he has had increasing number of headaches, These tend to occur in the PM and resolve after a night's sleep.  They have decreased since he had a dental problem fixed 4 weeks ago, although he did have another two days ago.  We explored possible connection to caffeine, sleep apnea and dental issue.   [de-identified] : Co-management: Ozempic for pre-DM and Apretude for PrEP prescribed at [facility] where he has gotten his 3 monthly labs on time.  He will consider moving his care to one PCP but is concerned about moving his Ozempic and Apretude because it was a long uphill struggle to get these approved at [facility].